# Patient Record
Sex: MALE | Race: WHITE | URBAN - METROPOLITAN AREA
[De-identification: names, ages, dates, MRNs, and addresses within clinical notes are randomized per-mention and may not be internally consistent; named-entity substitution may affect disease eponyms.]

---

## 2022-01-26 LAB
AMB EXT BILIRUBIN, TOTAL: 0.3 MG/DL
AMB EXT BUN: 17 MG/DL
AMB EXT CALCIUM: 9.2
AMB EXT CARBON DIOXIDE: 24
AMB EXT CHLORIDE: 102
AMB EXT CHOL/HDL RATIO: 2.2
AMB EXT CHOLESTEROL, TOTAL: 171 MG/DL
AMB EXT CMP ALT: 21 U/L
AMB EXT CMP AST: 21 U/L
AMB EXT CREATININE: 1.1 MG/DL
AMB EXT EGFR NON-AA: 69
AMB EXT GLUCOSE: 102 MG/DL
AMB EXT HDL CHOLESTEROL: 49 MG/DL
AMB EXT HEMATOCRIT: 41
AMB EXT HEMOGLOBIN: 13.8
AMB EXT MCV: 94
AMB EXT PLATELETS: 306
AMB EXT POSTASSIUM: 4.6 MMOL/L
AMB EXT PSA SCREEN: 1.8 NG/ML
AMB EXT SODIUM: 137 MMOL/L
AMB EXT TOTAL PROTEIN: 6.4
AMB EXT TRIGLYCERIDES: 64 MG/DL
AMB EXT VLDL: 12 MG/DL
AMB EXT WBC: 5.4 X10(3)UL

## 2022-10-28 ENCOUNTER — OFFICE VISIT (OUTPATIENT)
Dept: FAMILY MEDICINE CLINIC | Facility: CLINIC | Age: 67
End: 2022-10-28
Payer: MEDICARE

## 2022-10-28 VITALS
RESPIRATION RATE: 20 BRPM | HEART RATE: 104 BPM | OXYGEN SATURATION: 99 % | BODY MASS INDEX: 23.84 KG/M2 | HEIGHT: 72 IN | DIASTOLIC BLOOD PRESSURE: 60 MMHG | SYSTOLIC BLOOD PRESSURE: 130 MMHG | WEIGHT: 176 LBS | TEMPERATURE: 97 F

## 2022-10-28 DIAGNOSIS — N30.01 ACUTE CYSTITIS WITH HEMATURIA: Primary | ICD-10-CM

## 2022-10-28 DIAGNOSIS — M19.042 OSTEOARTHRITIS OF LEFT HAND, UNSPECIFIED OSTEOARTHRITIS TYPE: ICD-10-CM

## 2022-10-28 DIAGNOSIS — Z79.899 ENCOUNTER FOR LONG-TERM (CURRENT) USE OF MEDICATIONS: ICD-10-CM

## 2022-10-28 DIAGNOSIS — R35.0 URINARY FREQUENCY: ICD-10-CM

## 2022-10-28 DIAGNOSIS — I10 ESSENTIAL HYPERTENSION: ICD-10-CM

## 2022-10-28 DIAGNOSIS — J45.20 MILD INTERMITTENT ASTHMA IN ADULT WITHOUT COMPLICATION: ICD-10-CM

## 2022-10-28 DIAGNOSIS — Z12.11 SCREEN FOR COLON CANCER: ICD-10-CM

## 2022-10-28 DIAGNOSIS — Z23 NEED FOR VACCINATION: ICD-10-CM

## 2022-10-28 DIAGNOSIS — N40.1 BENIGN PROSTATIC HYPERPLASIA WITH LOWER URINARY TRACT SYMPTOMS, SYMPTOM DETAILS UNSPECIFIED: ICD-10-CM

## 2022-10-28 PROBLEM — J45.909 ASTHMA IN ADULT: Status: ACTIVE | Noted: 2022-10-28

## 2022-10-28 LAB
BILIRUBIN: NEGATIVE
GLUCOSE (URINE DIPSTICK): NEGATIVE MG/DL
KETONES (URINE DIPSTICK): NEGATIVE MG/DL
MULTISTIX LOT#: ABNORMAL NUMERIC
NITRITE, URINE: NEGATIVE
PH, URINE: 6.5 (ref 4.5–8)
SPECIFIC GRAVITY: 1.01 (ref 1–1.03)
URINE-COLOR: YELLOW
UROBILINOGEN,SEMI-QN: 0.2 MG/DL (ref 0–1.9)

## 2022-10-28 PROCEDURE — 87077 CULTURE AEROBIC IDENTIFY: CPT | Performed by: FAMILY MEDICINE

## 2022-10-28 PROCEDURE — 90662 IIV NO PRSV INCREASED AG IM: CPT | Performed by: FAMILY MEDICINE

## 2022-10-28 PROCEDURE — 87086 URINE CULTURE/COLONY COUNT: CPT | Performed by: FAMILY MEDICINE

## 2022-10-28 PROCEDURE — 81003 URINALYSIS AUTO W/O SCOPE: CPT | Performed by: FAMILY MEDICINE

## 2022-10-28 PROCEDURE — 3075F SYST BP GE 130 - 139MM HG: CPT | Performed by: FAMILY MEDICINE

## 2022-10-28 PROCEDURE — 87186 SC STD MICRODIL/AGAR DIL: CPT | Performed by: FAMILY MEDICINE

## 2022-10-28 PROCEDURE — 3078F DIAST BP <80 MM HG: CPT | Performed by: FAMILY MEDICINE

## 2022-10-28 PROCEDURE — 3008F BODY MASS INDEX DOCD: CPT | Performed by: FAMILY MEDICINE

## 2022-10-28 PROCEDURE — G0008 ADMIN INFLUENZA VIRUS VAC: HCPCS | Performed by: FAMILY MEDICINE

## 2022-10-28 PROCEDURE — 1125F AMNT PAIN NOTED PAIN PRSNT: CPT | Performed by: FAMILY MEDICINE

## 2022-10-28 PROCEDURE — 99203 OFFICE O/P NEW LOW 30 MIN: CPT | Performed by: FAMILY MEDICINE

## 2022-10-28 RX ORDER — FINASTERIDE 5 MG/1
5 TABLET, FILM COATED ORAL DAILY
Qty: 90 TABLET | Refills: 3 | Status: SHIPPED | OUTPATIENT
Start: 2022-10-28

## 2022-10-28 RX ORDER — FINASTERIDE 5 MG/1
1 TABLET, FILM COATED ORAL DAILY
COMMUNITY
Start: 2022-09-30 | End: 2022-10-28

## 2022-10-28 RX ORDER — LISINOPRIL 20 MG/1
20 TABLET ORAL NIGHTLY
Qty: 90 TABLET | Refills: 3 | Status: SHIPPED | OUTPATIENT
Start: 2022-10-28

## 2022-10-28 RX ORDER — FLUTICASONE PROPIONATE 44 UG/1
1 AEROSOL, METERED RESPIRATORY (INHALATION) 2 TIMES DAILY
Qty: 3 EACH | Refills: 1 | Status: SHIPPED | OUTPATIENT
Start: 2022-10-28

## 2022-10-28 RX ORDER — TAMSULOSIN HYDROCHLORIDE 0.4 MG/1
0.4 CAPSULE ORAL EVERY EVENING
COMMUNITY
Start: 2021-10-28 | End: 2022-10-28

## 2022-10-28 RX ORDER — LISINOPRIL 20 MG/1
1 TABLET ORAL DAILY
COMMUNITY
Start: 2004-08-28 | End: 2022-10-28

## 2022-10-28 RX ORDER — TAMSULOSIN HYDROCHLORIDE 0.4 MG/1
0.4 CAPSULE ORAL NIGHTLY
Qty: 90 CAPSULE | Refills: 3 | Status: SHIPPED | OUTPATIENT
Start: 2022-10-28

## 2022-10-28 RX ORDER — NITROFURANTOIN 25; 75 MG/1; MG/1
100 CAPSULE ORAL 2 TIMES DAILY
Qty: 20 CAPSULE | Refills: 0 | Status: SHIPPED | OUTPATIENT
Start: 2022-10-28 | End: 2022-11-07

## 2022-10-28 RX ORDER — FLUTICASONE PROPIONATE 44 UG/1
1 AEROSOL, METERED RESPIRATORY (INHALATION) 2 TIMES DAILY
COMMUNITY
Start: 2022-02-03 | End: 2022-10-28

## 2022-10-28 NOTE — PATIENT INSTRUCTIONS
-Remember to request your urologist send us their progress note when you go to see them next week. -The antibiotic for the urinary tract infection has been sent for a 10-day course, your urologist may have you take this for a lesser amount of days.  -We are sending the urine for culture and will address need for change in antibiotic if indicated by the urine culture.

## 2022-11-20 ENCOUNTER — PATIENT MESSAGE (OUTPATIENT)
Dept: FAMILY MEDICINE CLINIC | Facility: CLINIC | Age: 67
End: 2022-11-20

## 2022-11-21 LAB — AMB EXT PSA SCREEN: 1.2 NG/ML

## 2022-11-21 NOTE — TELEPHONE ENCOUNTER
From: Kelly Mckinney  To: Chris Madison DO  Sent: 11/20/2022 9:19 AM CST  Subject: Medical release    Good morning,    My Urologist, Dr. 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 I had a nuclear medicine renogram for an enlarged left kidney and it shows a blockage. Dr Sean Juárez is with [address] [place] [address] Their number is [phone] and the fax is [phone]. I have to make an appointment tomorrow, 11/21/22, for the procedure and get some pre procedure testing done. I do not need a referral for this procedure, but will need to talk to you about referrals after January 1 when I switch insurance carriers. Please let me know if you need additional information from me. I appreciate your help. Thank you.

## 2022-11-22 ENCOUNTER — TELEPHONE (OUTPATIENT)
Dept: FAMILY MEDICINE CLINIC | Facility: CLINIC | Age: 67
End: 2022-11-22

## 2022-11-22 NOTE — TELEPHONE ENCOUNTER
Pre-op for DOS 12/13/22 at [facility].  Pre-op froms received and placed in MA's basket with checklist. Pre-op apt 11/23/22

## 2022-11-23 ENCOUNTER — OFFICE VISIT (OUTPATIENT)
Dept: FAMILY MEDICINE CLINIC | Facility: CLINIC | Age: 67
End: 2022-11-23
Payer: MEDICARE

## 2022-11-23 ENCOUNTER — MED REC SCAN ONLY (OUTPATIENT)
Dept: FAMILY MEDICINE CLINIC | Facility: CLINIC | Age: 67
End: 2022-11-23

## 2022-11-23 VITALS
HEIGHT: 72 IN | BODY MASS INDEX: 24.76 KG/M2 | RESPIRATION RATE: 22 BRPM | WEIGHT: 182.81 LBS | HEART RATE: 92 BPM | TEMPERATURE: 98 F | SYSTOLIC BLOOD PRESSURE: 110 MMHG | OXYGEN SATURATION: 97 % | DIASTOLIC BLOOD PRESSURE: 80 MMHG

## 2022-11-23 DIAGNOSIS — I10 ESSENTIAL HYPERTENSION: ICD-10-CM

## 2022-11-23 DIAGNOSIS — Z01.818 PREOP EXAMINATION: Primary | ICD-10-CM

## 2022-11-23 DIAGNOSIS — N13.5 OBSTRUCTION OF LEFT URETEROPELVIC JUNCTION (UPJ): ICD-10-CM

## 2022-11-23 DIAGNOSIS — R33.9 RETENTION OF URINE: ICD-10-CM

## 2022-11-23 PROBLEM — M51.36 DDD (DEGENERATIVE DISC DISEASE), LUMBAR: Status: ACTIVE | Noted: 2022-11-23

## 2022-11-23 PROBLEM — N40.0 ENLARGED PROSTATE: Status: ACTIVE | Noted: 2022-02-02

## 2022-11-23 PROCEDURE — 3074F SYST BP LT 130 MM HG: CPT | Performed by: FAMILY MEDICINE

## 2022-11-23 PROCEDURE — 1126F AMNT PAIN NOTED NONE PRSNT: CPT | Performed by: FAMILY MEDICINE

## 2022-11-23 PROCEDURE — 3008F BODY MASS INDEX DOCD: CPT | Performed by: FAMILY MEDICINE

## 2022-11-23 PROCEDURE — 3079F DIAST BP 80-89 MM HG: CPT | Performed by: FAMILY MEDICINE

## 2022-11-23 PROCEDURE — 99214 OFFICE O/P EST MOD 30 MIN: CPT | Performed by: FAMILY MEDICINE

## 2022-11-23 NOTE — PATIENT INSTRUCTIONS
-You may proceed with urologic surgery, we do not anticipate an abnormality of the EKG, chest x-ray, or lab results that would prevent you from proceeding with surgery.    -We will send your history and physical/medical clearance note once we have received all of the above results.

## 2022-11-25 LAB
AMB EXT BUN: 19 MG/DL
AMB EXT CALCIUM: 8.6
AMB EXT CARBON DIOXIDE: 25
AMB EXT CHLORIDE: 107
AMB EXT CREATININE: 0.98 MG/DL
AMB EXT GLUCOSE: 101 MG/DL
AMB EXT HEMATOCRIT: 37.8
AMB EXT HEMOGLOBIN: 12.3
AMB EXT MCV: 93.1
AMB EXT PLATELETS: 235
AMB EXT POSTASSIUM: 4.1 MMOL/L
AMB EXT SODIUM: 139 MMOL/L
AMB EXT WBC: 4.1 X10(3)UL

## 2022-11-30 ENCOUNTER — MED REC SCAN ONLY (OUTPATIENT)
Dept: FAMILY MEDICINE CLINIC | Facility: CLINIC | Age: 67
End: 2022-11-30

## 2023-01-02 ENCOUNTER — PATIENT MESSAGE (OUTPATIENT)
Dept: FAMILY MEDICINE CLINIC | Facility: CLINIC | Age: 68
End: 2023-01-02

## 2023-01-02 DIAGNOSIS — Z79.899 ENCOUNTER FOR LONG-TERM (CURRENT) USE OF MEDICATIONS: ICD-10-CM

## 2023-01-02 DIAGNOSIS — N13.5 OBSTRUCTION OF LEFT URETEROPELVIC JUNCTION (UPJ): Primary | ICD-10-CM

## 2023-01-02 DIAGNOSIS — Z96.0 URETERAL STENT PRESENT: ICD-10-CM

## 2023-01-02 DIAGNOSIS — N40.1 BENIGN PROSTATIC HYPERPLASIA WITH LOWER URINARY TRACT SYMPTOMS, SYMPTOM DETAILS UNSPECIFIED: ICD-10-CM

## 2023-01-02 DIAGNOSIS — I10 ESSENTIAL HYPERTENSION: ICD-10-CM

## 2023-01-03 RX ORDER — LISINOPRIL 20 MG/1
20 TABLET ORAL NIGHTLY
Qty: 90 TABLET | Refills: 2 | Status: SHIPPED | OUTPATIENT
Start: 2023-01-03

## 2023-01-03 RX ORDER — TAMSULOSIN HYDROCHLORIDE 0.4 MG/1
0.4 CAPSULE ORAL NIGHTLY
Qty: 90 CAPSULE | Refills: 2 | Status: SHIPPED | OUTPATIENT
Start: 2023-01-03

## 2023-01-03 RX ORDER — FINASTERIDE 5 MG/1
5 TABLET, FILM COATED ORAL DAILY
Qty: 90 TABLET | Refills: 2 | Status: SHIPPED | OUTPATIENT
Start: 2023-01-03

## 2023-01-03 NOTE — TELEPHONE ENCOUNTER
From: Joceline Lundberg  To: Felix Decbrett   Sent: 1/2/2023 1:10 PM CST  Subject: Referral and refills    Happy New Year,  We have new health insurance, [employer] HMO, and I need a referral to my Urologist, Dr. Nighat Kim, for the removal of the stent placed on 12/13/22. This will be in in office procedure. The procedure is scheduled for 1/13/23. Also, due to the new insurance, I will need scripts for my daily meds, [id number], Finasteride 5mg, and Tamsulosin 0.4mg. The mail order pharmacy in [facility] and the Physician inquiry # is [phone]. I can manage these scripts for release when needed. Please let me know when the referral is ready and I can pick it up if needed. Thank you and I hope you had a great Holiday Season.   Mukul Srinivasan   [phone]

## 2023-01-03 NOTE — TELEPHONE ENCOUNTER
I have routed the front office his new insurance information. After they have entered it, I will process his requests.

## 2023-01-10 ENCOUNTER — PATIENT MESSAGE (OUTPATIENT)
Dept: FAMILY MEDICINE CLINIC | Facility: CLINIC | Age: 68
End: 2023-01-10

## 2023-01-12 NOTE — TELEPHONE ENCOUNTER
Dr. Mustafa Mins office phoned. States they need CPT code [id number] added to visit. They attempted to obtain auth from [facility] but [employer] told them it has to come from PCP office. Referral dept notified.

## 2023-01-13 ENCOUNTER — TELEPHONE (OUTPATIENT)
Dept: ADMINISTRATIVE | Age: 68
End: 2023-01-13

## 2023-01-13 NOTE — TELEPHONE ENCOUNTER
Certificate Information  Certification Number  [id number]    Status  CERTIFIED IN TOTAL    Message  Authorization is based on information provided; it is not a guarantee of payment. Billed services are subject to medical necessity, appropriate setting, billing/coding, plan limits, eligibility at time of service. Verify benefits online or call Customer Service.   Member Information  Patient Name  Alesia Eden    Patient Date of Birth  [birth year]    Patient Gender  Male    Member ID  [id number]    Relationship to [address] Name  Alesia Eden    Requesting Provider     Name  Chiara Zuleta, [facility]  [id number]    Specialty  [id number]  Provider Role  Provider    Address  [address], [place], [address]    Phone  [phone]  Fax  [phone]    Contact Name  Jero Garcia  Service Type  1 - Medical Care    Service From - To Date  2023-01-13 - 2023-05-12    Quantity  1 Visits  Diagnosis Code 1  N135 - Crossing vessel and stricture of ureter w/o hydronephrosis    Diagnosis Code 2  I425 - Presence of urogenital implants    Procedure Code 1 (CPT/HCPCS)  75083 - CYSTOSCOPY AND TREATMENT    Quantity  1 Units    Procedure From - To Date  2023-01-13 - 2023-05-12    Procedure Code 2 (CPT/HCPCS)  [address]  [address]    Quantity  3 Units    Procedure From - To Date  2023-01-13 - 2023-05-12    Rendering Provider/Facility     Provider 1  Name  Anthony Archuleta  [id number]    Specialty  [id number]  Provider Role  Service Provider    Address  [address] [facility] [address] [facility]    Phone  [phone]  Provider 2  Name  [facility]    NPI  [id number]    Provider Role  Facility    Address  [address] [facility] [address] [facility]    Phone  [phone]

## 2023-01-13 NOTE — TELEPHONE ENCOUNTER
Referral dept states CPT code is authorized and they faxed Dejuan Graham to [phone] Dr. Loly Robins office.

## 2023-03-09 ENCOUNTER — PATIENT OUTREACH (OUTPATIENT)
Dept: FAMILY MEDICINE CLINIC | Facility: CLINIC | Age: 68
End: 2023-03-09

## 2023-03-16 NOTE — PROGRESS NOTES
Called pt's wife to reschedule cancelled MA Supervisit. Enedina Arshad says they are moving out of state. Pt was informed to contact insurance to update PCP. Enedina Arshad voiced understanding.